# Patient Record
Sex: FEMALE | Race: WHITE | URBAN - METROPOLITAN AREA
[De-identification: names, ages, dates, MRNs, and addresses within clinical notes are randomized per-mention and may not be internally consistent; named-entity substitution may affect disease eponyms.]

---

## 2017-03-23 ENCOUNTER — THERAPY VISIT (OUTPATIENT)
Dept: PHYSICAL THERAPY | Facility: CLINIC | Age: 37
End: 2017-03-23
Payer: COMMERCIAL

## 2017-03-23 DIAGNOSIS — M25.511 RIGHT SHOULDER PAIN: Primary | ICD-10-CM

## 2017-03-23 PROCEDURE — 97161 PT EVAL LOW COMPLEX 20 MIN: CPT | Mod: GP | Performed by: PHYSICAL THERAPIST

## 2017-03-23 PROCEDURE — 97112 NEUROMUSCULAR REEDUCATION: CPT | Mod: GP | Performed by: PHYSICAL THERAPIST

## 2017-03-23 NOTE — MR AVS SNAPSHOT
"              After Visit Summary   3/23/2017    Zander Kinney    MRN: [id number]           Patient Information     Date Of Birth          1980        Visit Information        Provider Department      3/23/2017 6:10 PM Vikash Hunter PT [place] [facility] [place]        Today's Diagnoses     Right shoulder pain    -  1       Follow-ups after your visit        Your next 10 appointments already scheduled     Mar 28, 2017 12:50 PM CDT   [facility] Extremity with Vikash Hunter PT   [place] [facility] [place] ([place] [address] [facility] [id number]                 Who to contact     If you have questions or need follow up information about today's clinic visit or your schedule please contact [facility] directly at No information on file..  Normal or non-critical lab and imaging results will be communicated to you by Curalatehart, letter or phone within 4 business days after the clinic has received the results. If you do not hear from us within 7 days, please contact the clinic through Curalatehart or phone. If you have a critical or abnormal lab result, we will notify you by phone as soon as possible.  Submit refill requests through PAAY or call your pharmacy and they will forward the refill request to us. Please allow 3 business days for your refill to be completed.          Additional Information About Your Visit        Curalatehart Information     PAAY lets you send messages to your doctor, view your test results, renew your prescriptions, schedule appointments and more. To sign up, go to [email or web address] . Click on \"Log in\" on the left side of the screen, which will take you to the Welcome page. Then click on \"Sign up Now\" on the right side of the page.     You will be asked to enter the access code listed below, as well as some personal information. Please follow the directions to create your username and " password.     Your access code is: [email or web address]  Expires: 2017  8:02 AM     Your access code will  in 90 days. If you need help or a new code, please call your [facility] clinic or [phone].        Care EveryWhere ID     This is your Care EveryWhere ID. This could be used by other organizations to access your [facility] medical records  [id number]         Blood Pressure from Last 3 Encounters:   No data found for BP    Weight from Last 3 Encounters:   No data found for Wt              We Performed the Following     HC PT EVAL, LOW COMPLEXITY     [facility] INITIAL EVAL REPORT     NEUROMUSCULAR RE-EDUCATION        Primary Care Provider    None Specified       No primary provider on file.        Thank you!     Thank you for choosing [facility]  for your care. Our goal is always to provide you with excellent care. Hearing back from our patients is one way we can continue to improve our services. Please take a few minutes to complete the written survey that you may receive in the mail after your visit with us. Thank you!             Your Updated Medication List - Protect others around you: Learn how to safely use, store and throw away your medicines at www.disposemymeds.org.          This list is accurate as of: 3/23/17 11:59 PM.  Always use your most recent med list.                   Brand Name Dispense Instructions for use    NO ACTIVE MEDICATIONS

## 2017-03-23 NOTE — PROGRESS NOTES
[facility] Initial Evaluation  Physical Therapy Initial Examination/Evaluation  March 23, 2017    Zander Kinney is a 36 year old  female referred to physical therapy by Dr. Cueto for treatment of right shoulder/scapula pain with Precautions/Restrictions/MD instructions eval and treat    Subjective:  DOI/onset: November 2016  Mechanism of injury: Developed chronic right scapula pain for many years, however, exacerbation of symptoms occurred after pt was diagnosed with shingles  DOS N/A  Previous treatment: Chiropractic treatment which has some short term benefit  Imaging: None  Chief Complaint:   Right shoulder and scapula pain that increases with various activities including reaching, lifting, and pushing   Pain: rest 3 /10, activity 6/10 with reaching Described as: ache sometimes sharp Alleviated by: rest, inactivity Frequency: intermittent Progression of symptoms since initial onset: same Time of day when pain is worse: day  Sleeping: Will wake if sleeping on right side  Occupation:   Job duties:  Computer work    Current HEP/exercise regimen: Scapular stabilization, posture exercises  Patient's goals are Decrease right scapula/shoulder pain, return to prior level of function without pain    Pertinent PMH: Davila-parkinson-white syndrome   General Health Reported by Patient: good  Return to MD:  6 weeks if no improvement      SHOULDER EVALUATION  Static Posture:  Forward head: Mild Rounded shoulders: Mild Scapular winging: Mild    Dynamic scapular assessment: Decreased early upward rotation of right scapula  Flip Sign: Negative     Range of Motion:    AROM Flexion Abduction Flex/ER Ext/IR   Left 180 180 T2 T8   Right 180 180 T2 T8     Strength:  MMT Flex Scaption Abd ER @ 0 IR @ 0 Mid trap Lower trap   Left 5 5 5 5 5 4+ 4+   Right 5 5 5 5 5 4- 4-     Special Tests  Left Right   Empty Can - -   Drop arm  - -   Jamesgason's  - -   Speed's - -   Pinky - -   Stephie - -    Lift-off - -   Apprehension - -   Hinson's - +   Sulcus Sign - -   AC cross body - -                                              Palpation:  Left: Unremarkable  Right: Moderate pain upon palpation of right upper trap and medial border of scapula       HPI                    Objective:    System    Physical Exam    General     ROS    Assessment/Plan:      Patient is a 36 year old female with right side shoulder complaints.    Patient has the following significant findings with corresponding treatment plan.                Diagnosis 1:  Right shoulder pain  Pain -  manual therapy, self management, education and home program  Decreased strength - therapeutic exercise, therapeutic activities and home program  Decreased proprioception - neuro re-education, therapeutic activities and home program    Therapy Evaluation Codes:   1) History comprised of:   Personal factors that impact the plan of care:      None.    Comorbidity factors that impact the plan of care are:      Davila-Parkinson-White syndrome.     Medications impacting care: None.  2) Examination of Body Systems comprised of:   Body structures and functions that impact the plan of care:      Shoulder.   Activity limitations that impact the plan of care are:      Lifting and reaching.  3) Clinical presentation characteristics are:   Stable/Uncomplicated.  4) Decision-Making    Low complexity using standardized patient assessment instrument and/or measureable assessment of functional outcome.  Cumulative Therapy Evaluation is: Low complexity.    Previous and current functional limitations:  (See Goal Flow Sheet for this information)    Short term and Long term goals: (See Goal Flow Sheet for this information)     Communication ability:  Patient appears to be able to clearly communicate and understand verbal and written communication and follow directions correctly.  Treatment Explanation - The following has been discussed with the patient:   RX ordered/plan of  care  Anticipated outcomes  Possible risks and side effects  This patient would benefit from PT intervention to resume normal activities.   Rehab potential is good.    Frequency:  1 X week, once daily  Duration:  for 6 weeks  Discharge Plan:  Achieve all LTG.  Independent in home treatment program.  Reach maximal therapeutic benefit.    Please refer to the daily flowsheet for treatment today, total treatment time and time spent performing 1:1 timed codes.

## 2017-03-28 ENCOUNTER — THERAPY VISIT (OUTPATIENT)
Dept: PHYSICAL THERAPY | Facility: CLINIC | Age: 37
End: 2017-03-28
Payer: COMMERCIAL

## 2017-03-28 DIAGNOSIS — M25.511 RIGHT SHOULDER PAIN: ICD-10-CM

## 2017-03-28 PROCEDURE — 97112 NEUROMUSCULAR REEDUCATION: CPT | Mod: GP | Performed by: PHYSICAL THERAPIST

## 2017-03-28 PROCEDURE — 97110 THERAPEUTIC EXERCISES: CPT | Mod: GP | Performed by: PHYSICAL THERAPIST

## 2017-03-28 PROCEDURE — 97140 MANUAL THERAPY 1/> REGIONS: CPT | Mod: GP | Performed by: PHYSICAL THERAPIST

## 2017-04-24 ENCOUNTER — THERAPY VISIT (OUTPATIENT)
Dept: PHYSICAL THERAPY | Facility: CLINIC | Age: 37
End: 2017-04-24
Payer: COMMERCIAL

## 2017-04-24 DIAGNOSIS — M25.511 RIGHT SHOULDER PAIN: ICD-10-CM

## 2017-04-24 PROCEDURE — 97110 THERAPEUTIC EXERCISES: CPT | Mod: GP | Performed by: PHYSICAL THERAPIST

## 2017-04-24 PROCEDURE — 97112 NEUROMUSCULAR REEDUCATION: CPT | Mod: GP | Performed by: PHYSICAL THERAPIST

## 2017-04-24 PROCEDURE — 97140 MANUAL THERAPY 1/> REGIONS: CPT | Mod: GP | Performed by: PHYSICAL THERAPIST

## 2017-05-01 ENCOUNTER — THERAPY VISIT (OUTPATIENT)
Dept: PHYSICAL THERAPY | Facility: CLINIC | Age: 37
End: 2017-05-01
Payer: COMMERCIAL

## 2017-05-01 DIAGNOSIS — M25.511 RIGHT SHOULDER PAIN: ICD-10-CM

## 2017-05-01 PROCEDURE — 97110 THERAPEUTIC EXERCISES: CPT | Mod: GP | Performed by: PHYSICAL THERAPIST

## 2017-05-01 PROCEDURE — 97140 MANUAL THERAPY 1/> REGIONS: CPT | Mod: GP | Performed by: PHYSICAL THERAPIST

## 2017-05-08 ENCOUNTER — THERAPY VISIT (OUTPATIENT)
Dept: PHYSICAL THERAPY | Facility: CLINIC | Age: 37
End: 2017-05-08
Payer: COMMERCIAL

## 2017-05-08 DIAGNOSIS — M25.511 RIGHT SHOULDER PAIN: ICD-10-CM

## 2017-05-08 PROCEDURE — 97140 MANUAL THERAPY 1/> REGIONS: CPT | Mod: GP | Performed by: PHYSICAL THERAPIST

## 2017-05-08 PROCEDURE — 97110 THERAPEUTIC EXERCISES: CPT | Mod: GP | Performed by: PHYSICAL THERAPIST

## 2017-05-15 ENCOUNTER — THERAPY VISIT (OUTPATIENT)
Dept: PHYSICAL THERAPY | Facility: CLINIC | Age: 37
End: 2017-05-15
Payer: COMMERCIAL

## 2017-05-15 DIAGNOSIS — M25.511 RIGHT SHOULDER PAIN: ICD-10-CM

## 2017-05-15 PROCEDURE — 97112 NEUROMUSCULAR REEDUCATION: CPT | Mod: GP | Performed by: PHYSICAL THERAPIST

## 2017-05-15 PROCEDURE — 97140 MANUAL THERAPY 1/> REGIONS: CPT | Mod: GP | Performed by: PHYSICAL THERAPIST

## 2017-05-26 ENCOUNTER — THERAPY VISIT (OUTPATIENT)
Dept: PHYSICAL THERAPY | Facility: CLINIC | Age: 37
End: 2017-05-26
Payer: COMMERCIAL

## 2017-05-26 DIAGNOSIS — M25.511 RIGHT SHOULDER PAIN: ICD-10-CM

## 2017-05-26 PROCEDURE — 97530 THERAPEUTIC ACTIVITIES: CPT | Mod: GP | Performed by: PHYSICAL THERAPIST
